# Patient Record
Sex: MALE | Race: BLACK OR AFRICAN AMERICAN | NOT HISPANIC OR LATINO | URBAN - METROPOLITAN AREA
[De-identification: names, ages, dates, MRNs, and addresses within clinical notes are randomized per-mention and may not be internally consistent; named-entity substitution may affect disease eponyms.]

---

## 2023-06-01 ENCOUNTER — OFFICE VISIT (OUTPATIENT)
Dept: PRIMARY CARE | Facility: CLINIC | Age: 43
End: 2023-06-01
Payer: COMMERCIAL

## 2023-06-01 VITALS
HEART RATE: 85 BPM | DIASTOLIC BLOOD PRESSURE: 98 MMHG | SYSTOLIC BLOOD PRESSURE: 162 MMHG | HEIGHT: 68 IN | OXYGEN SATURATION: 98 % | RESPIRATION RATE: 12 BRPM | BODY MASS INDEX: 31.52 KG/M2 | WEIGHT: 208 LBS

## 2023-06-01 DIAGNOSIS — N34.2 URETHRITIS: Primary | ICD-10-CM

## 2023-06-01 DIAGNOSIS — R30.0 BURNING WITH URINATION: ICD-10-CM

## 2023-06-01 DIAGNOSIS — Z20.2 POSSIBLE EXPOSURE TO STD: ICD-10-CM

## 2023-06-01 PROBLEM — M54.16 LEFT LUMBAR RADICULOPATHY: Status: ACTIVE | Noted: 2023-06-01

## 2023-06-01 PROBLEM — K76.9 CHRONIC LIVER DISEASE: Status: ACTIVE | Noted: 2023-06-01

## 2023-06-01 PROBLEM — M54.30 SCIATIC PAIN: Status: ACTIVE | Noted: 2023-06-01

## 2023-06-01 PROBLEM — K21.00 REFLUX ESOPHAGITIS: Status: ACTIVE | Noted: 2023-06-01

## 2023-06-01 PROBLEM — S39.840A PENILE FRACTURE, INITIAL ENCOUNTER: Status: ACTIVE | Noted: 2023-06-01

## 2023-06-01 PROBLEM — A74.9 CHLAMYDIA INFECTION: Status: ACTIVE | Noted: 2023-06-01

## 2023-06-01 PROBLEM — J30.9 ALLERGIC RHINITIS: Status: ACTIVE | Noted: 2023-06-01

## 2023-06-01 PROBLEM — B96.81 HELICOBACTER PYLORI GASTRITIS: Status: ACTIVE | Noted: 2023-06-01

## 2023-06-01 PROBLEM — M79.641 PAIN OF RIGHT HAND: Status: ACTIVE | Noted: 2023-06-01

## 2023-06-01 PROBLEM — A04.8 H. PYLORI INFECTION: Status: ACTIVE | Noted: 2023-06-01

## 2023-06-01 PROBLEM — R53.1 WEAKNESS GENERALIZED: Status: ACTIVE | Noted: 2023-06-01

## 2023-06-01 PROBLEM — K29.70 HELICOBACTER PYLORI GASTRITIS: Status: ACTIVE | Noted: 2023-06-01

## 2023-06-01 PROBLEM — N52.9 MALE ERECTILE DISORDER: Status: ACTIVE | Noted: 2023-06-01

## 2023-06-01 PROBLEM — S05.31XA: Status: ACTIVE | Noted: 2023-06-01

## 2023-06-01 PROBLEM — N48.89 PENILE PAIN: Status: ACTIVE | Noted: 2023-06-01

## 2023-06-01 PROBLEM — R10.9 ABDOMINAL PAIN: Status: ACTIVE | Noted: 2023-06-01

## 2023-06-01 PROBLEM — K62.5 BRBPR (BRIGHT RED BLOOD PER RECTUM): Status: ACTIVE | Noted: 2023-06-01

## 2023-06-01 PROBLEM — R74.8 ELEVATED LIVER ENZYMES: Status: ACTIVE | Noted: 2023-06-01

## 2023-06-01 PROBLEM — K57.32 DIVERTICULITIS OF COLON: Status: ACTIVE | Noted: 2023-06-01

## 2023-06-01 PROBLEM — F10.10 ALCOHOL ABUSE, CONTINUOUS: Status: ACTIVE | Noted: 2023-06-01

## 2023-06-01 PROBLEM — R39.9 LOWER URINARY TRACT SYMPTOMS (LUTS): Status: ACTIVE | Noted: 2023-06-01

## 2023-06-01 PROBLEM — K59.00 CONSTIPATION: Status: ACTIVE | Noted: 2023-06-01

## 2023-06-01 PROBLEM — I10 BENIGN ESSENTIAL HYPERTENSION: Status: ACTIVE | Noted: 2023-06-01

## 2023-06-01 PROBLEM — K76.0 FATTY LIVER: Status: ACTIVE | Noted: 2023-06-01

## 2023-06-01 PROBLEM — I10 HYPERTENSION: Status: ACTIVE | Noted: 2023-06-01

## 2023-06-01 LAB
POC APPEARANCE, URINE: CLEAR
POC BILIRUBIN, URINE: NEGATIVE
POC BLOOD, URINE: NEGATIVE
POC COLOR, URINE: YELLOW
POC GLUCOSE, URINE: NEGATIVE MG/DL
POC KETONES, URINE: NEGATIVE MG/DL
POC LEUKOCYTES, URINE: NEGATIVE
POC NITRITE,URINE: NEGATIVE
POC PH, URINE: 6.5 PH
POC PROTEIN, URINE: NEGATIVE MG/DL
POC SPECIFIC GRAVITY, URINE: 1.02
POC UROBILINOGEN, URINE: 0.2 EU/DL

## 2023-06-01 PROCEDURE — 87591 N.GONORRHOEAE DNA AMP PROB: CPT

## 2023-06-01 PROCEDURE — 1036F TOBACCO NON-USER: CPT | Performed by: INTERNAL MEDICINE

## 2023-06-01 PROCEDURE — 81002 URINALYSIS NONAUTO W/O SCOPE: CPT | Performed by: INTERNAL MEDICINE

## 2023-06-01 PROCEDURE — 3080F DIAST BP >= 90 MM HG: CPT | Performed by: INTERNAL MEDICINE

## 2023-06-01 PROCEDURE — 3077F SYST BP >= 140 MM HG: CPT | Performed by: INTERNAL MEDICINE

## 2023-06-01 PROCEDURE — 87491 CHLMYD TRACH DNA AMP PROBE: CPT

## 2023-06-01 PROCEDURE — 99213 OFFICE O/P EST LOW 20 MIN: CPT | Performed by: INTERNAL MEDICINE

## 2023-06-01 RX ORDER — DOXYCYCLINE 100 MG/1
100 CAPSULE ORAL 2 TIMES DAILY
Qty: 14 CAPSULE | Refills: 0 | Status: SHIPPED | OUTPATIENT
Start: 2023-06-01 | End: 2023-06-08

## 2023-06-01 NOTE — PROGRESS NOTES
"Subjective   Chief complaint: Lane Ireland is a 42 y.o. male who presents for burning with urinaton  (Pt c/o burning with urination for 2 weeks, pt denies any discharge. He has had unprotected intercourse lately ).    HPI:  Burning on urination burning while having ejaculation suspect sexually transmitted disease        Objective   BP (!) 162/98   Pulse 85   Resp 12   Ht 1.727 m (5' 8\")   Wt 94.3 kg (208 lb)   SpO2 98%   BMI 31.63 kg/m²   Physical Exam  Vitals reviewed.   Constitutional:       Appearance: Normal appearance.   HENT:      Head: Normocephalic and atraumatic.   Cardiovascular:      Rate and Rhythm: Normal rate and regular rhythm.   Pulmonary:      Effort: Pulmonary effort is normal.      Breath sounds: Normal breath sounds.   Abdominal:      General: Bowel sounds are normal.      Palpations: Abdomen is soft.   Musculoskeletal:      Cervical back: Neck supple.   Skin:     General: Skin is warm and dry.   Neurological:      General: No focal deficit present.      Mental Status: He is alert.   Psychiatric:         Mood and Affect: Mood normal.         Behavior: Behavior is cooperative.         I have reviewed and reconciled the medication list with the patient today. No current outpatient medications on file.     Imaging:  No results found.     Labs reviewed:    Lab Results   Component Value Date    WBC 6.4 04/11/2022    HGB 13.8 04/11/2022    HCT 42.5 04/11/2022     04/11/2022    CHOL 214 (H) 04/11/2022    TRIG 119 04/11/2022    HDL 72.1 04/11/2022    ALT 42 04/11/2022    AST 41 (H) 04/11/2022     (L) 04/11/2022    K 4.8 04/11/2022    CL 96 (L) 04/11/2022    CREATININE 0.72 04/11/2022    BUN 11 04/11/2022    CO2 26 04/11/2022    TSH 2.04 05/11/2021    INR 0.9 05/11/2021    HGBA1C 5.1 04/11/2022       Assessment/Plan   Problem List Items Addressed This Visit          Nervous    Burning with urination    Relevant Orders    POCT UA (nonautomated) manually resulted (Completed)    C. " Trachomatis / N. Gonorrhoeae, Amplified Detection     Other Visit Diagnoses       Possible exposure to STD        Relevant Orders    C. Trachomatis / N. Gonorrhoeae, Amplified Detection            Continue current medications as listed  Follow up inIn 1 week  Doxycycline for 1

## 2023-06-02 LAB
CHLAMYDIA TRACH., AMPLIFIED: NEGATIVE
N. GONORRHEA, AMPLIFIED: NEGATIVE

## 2023-08-04 ENCOUNTER — OFFICE VISIT (OUTPATIENT)
Dept: PRIMARY CARE | Facility: CLINIC | Age: 43
End: 2023-08-04
Payer: COMMERCIAL

## 2023-08-04 VITALS
WEIGHT: 209 LBS | RESPIRATION RATE: 12 BRPM | HEART RATE: 85 BPM | SYSTOLIC BLOOD PRESSURE: 138 MMHG | OXYGEN SATURATION: 98 % | HEIGHT: 69 IN | BODY MASS INDEX: 30.96 KG/M2 | DIASTOLIC BLOOD PRESSURE: 82 MMHG

## 2023-08-04 DIAGNOSIS — M77.9 TENDINITIS: Primary | ICD-10-CM

## 2023-08-04 DIAGNOSIS — M79.601 PAIN OF RIGHT UPPER EXTREMITY: ICD-10-CM

## 2023-08-04 PROBLEM — I10 PRIMARY HYPERTENSION: Status: ACTIVE | Noted: 2021-12-21

## 2023-08-04 PROCEDURE — 3079F DIAST BP 80-89 MM HG: CPT | Performed by: INTERNAL MEDICINE

## 2023-08-04 PROCEDURE — 1036F TOBACCO NON-USER: CPT | Performed by: INTERNAL MEDICINE

## 2023-08-04 PROCEDURE — 99214 OFFICE O/P EST MOD 30 MIN: CPT | Performed by: INTERNAL MEDICINE

## 2023-08-04 PROCEDURE — 3075F SYST BP GE 130 - 139MM HG: CPT | Performed by: INTERNAL MEDICINE

## 2023-08-04 RX ORDER — METHYLPREDNISOLONE 4 MG/1
TABLET ORAL
Qty: 21 TABLET | Refills: 0 | Status: SHIPPED | OUTPATIENT
Start: 2023-08-04 | End: 2023-08-11

## 2023-08-04 RX ORDER — MELOXICAM 7.5 MG/1
7.5 TABLET ORAL 2 TIMES DAILY
Qty: 60 TABLET | Refills: 11 | Status: SHIPPED | OUTPATIENT
Start: 2023-08-04

## 2023-08-04 NOTE — PROGRESS NOTES
"Subjective   Chief complaint: Lane Ireland is a 42 y.o. male who presents for Arm Pain (Pt c/o that for 2 weeks he been having right elbow and arm pain that shoots up to his shoulder ,he is taking motrin 600 not helping. ).    HPI:  Follow-up right elbow pain radiate all the way to the wrist area and when he tried to make a  or try to hold of something he feels the pain going out all the way in the forearm to the elbow patient work with his hand and turn his hand all the time at work.  Trying to avoid that by working on the other arm which is making him in pain    Arm Pain         Objective   /82   Pulse 85   Resp 12   Ht 1.753 m (5' 9\")   Wt 94.8 kg (209 lb)   SpO2 98%   BMI 30.86 kg/m²   Physical Exam  Vitals reviewed.   Constitutional:       Appearance: Normal appearance.   HENT:      Head: Normocephalic and atraumatic.   Cardiovascular:      Rate and Rhythm: Normal rate and regular rhythm.   Pulmonary:      Effort: Pulmonary effort is normal.      Breath sounds: Normal breath sounds.   Abdominal:      General: Bowel sounds are normal.      Palpations: Abdomen is soft.   Musculoskeletal:      Cervical back: Neck supple.      Comments: Reproduced when the patient grab an object or flex his hand   Skin:     General: Skin is warm and dry.   Neurological:      General: No focal deficit present.      Mental Status: He is alert.   Psychiatric:         Mood and Affect: Mood normal.         Behavior: Behavior is cooperative.         I have reviewed and reconciled the medication list with the patient today. No current outpatient medications on file.     Imaging:  No results found.     Labs reviewed:    Lab Results   Component Value Date    WBC 6.4 04/11/2022    HGB 13.8 04/11/2022    HCT 42.5 04/11/2022     04/11/2022    CHOL 214 (H) 04/11/2022    TRIG 119 04/11/2022    HDL 72.1 04/11/2022    ALT 42 04/11/2022    AST 41 (H) 04/11/2022     (L) 04/11/2022    K 4.8 04/11/2022    CL 96 (L) " 04/11/2022    CREATININE 0.72 04/11/2022    BUN 11 04/11/2022    CO2 26 04/11/2022    TSH 2.04 05/11/2021    INR 0.9 05/11/2021    HGBA1C 5.1 04/11/2022       Assessment/Plan   Problem List Items Addressed This Visit       Pain of right upper extremity    Tendinitis - Primary   For the acute tendinitis we are going to start steroid Dosepak.  Mobic 7-1/2 twice a day    Continue current medications as listed  Follow up in 2 months for complete physical

## 2023-08-25 ENCOUNTER — OFFICE VISIT (OUTPATIENT)
Dept: PRIMARY CARE | Facility: CLINIC | Age: 43
End: 2023-08-25
Payer: COMMERCIAL

## 2023-08-25 VITALS
HEART RATE: 79 BPM | RESPIRATION RATE: 12 BRPM | SYSTOLIC BLOOD PRESSURE: 112 MMHG | BODY MASS INDEX: 31.01 KG/M2 | OXYGEN SATURATION: 96 % | DIASTOLIC BLOOD PRESSURE: 80 MMHG | WEIGHT: 210 LBS

## 2023-08-25 DIAGNOSIS — R10.9 ABDOMINAL PAIN, UNSPECIFIED ABDOMINAL LOCATION: ICD-10-CM

## 2023-08-25 DIAGNOSIS — R31.9 HEMATURIA, UNSPECIFIED TYPE: ICD-10-CM

## 2023-08-25 DIAGNOSIS — N30.01 ACUTE CYSTITIS WITH HEMATURIA: Primary | ICD-10-CM

## 2023-08-25 PROCEDURE — 3074F SYST BP LT 130 MM HG: CPT | Performed by: INTERNAL MEDICINE

## 2023-08-25 PROCEDURE — 1036F TOBACCO NON-USER: CPT | Performed by: INTERNAL MEDICINE

## 2023-08-25 PROCEDURE — 99214 OFFICE O/P EST MOD 30 MIN: CPT | Performed by: INTERNAL MEDICINE

## 2023-08-25 PROCEDURE — 3079F DIAST BP 80-89 MM HG: CPT | Performed by: INTERNAL MEDICINE

## 2023-08-25 PROCEDURE — 81003 URINALYSIS AUTO W/O SCOPE: CPT | Performed by: INTERNAL MEDICINE

## 2023-08-25 RX ORDER — SULFAMETHOXAZOLE AND TRIMETHOPRIM 800; 160 MG/1; MG/1
1 TABLET ORAL 2 TIMES DAILY
Qty: 14 TABLET | Refills: 0 | Status: SHIPPED | OUTPATIENT
Start: 2023-08-25 | End: 2023-09-01

## 2023-08-25 RX ORDER — PHENAZOPYRIDINE HYDROCHLORIDE 100 MG/1
100 TABLET, FILM COATED ORAL 3 TIMES DAILY PRN
Qty: 90 TABLET | Refills: 0 | Status: SHIPPED | OUTPATIENT
Start: 2023-08-25

## 2023-08-25 ASSESSMENT — ENCOUNTER SYMPTOMS: ABDOMINAL PAIN: 1

## 2023-08-25 NOTE — PROGRESS NOTES
Subjective   Chief complaint: Milton Limon is a 43 y.o. male who presents for Abdominal Pain (Pt c/o lower abd pain, painful when he urinated with burning and frequency for 3 days. ).    HPI:  Patient presented to me was pain in urination was discomfort there is no emiliana hematuria but the pain is very severe in the pubic area and the pelvic area and associated with urgency and frequency.    Abdominal Pain        Objective   /80   Pulse 79   Resp 12   Wt 95.3 kg (210 lb)   SpO2 96%   BMI 31.01 kg/m²   Physical Exam  Vitals reviewed.   Constitutional:       Appearance: Normal appearance.   HENT:      Head: Normocephalic and atraumatic.   Cardiovascular:      Rate and Rhythm: Normal rate and regular rhythm.   Pulmonary:      Effort: Pulmonary effort is normal.      Breath sounds: Normal breath sounds.   Abdominal:      General: Bowel sounds are normal.      Palpations: Abdomen is soft.   Genitourinary:     Comments: Tender suprapubic area  Musculoskeletal:      Cervical back: Neck supple.   Skin:     General: Skin is warm and dry.   Neurological:      General: No focal deficit present.      Mental Status: He is alert.   Psychiatric:         Mood and Affect: Mood normal.         Behavior: Behavior is cooperative.         I have reviewed and reconciled the medication list with the patient today.   Current Outpatient Medications:     meloxicam (Mobic) 7.5 mg tablet, Take 1 tablet (7.5 mg) by mouth 2 times a day. (Patient not taking: Reported on 8/25/2023), Disp: 60 tablet, Rfl: 11     Imaging:  CT abdomen pelvis wo IV contrast    Result Date: 8/25/2023  Interpreted By:  FARHAD MATHIS MD MRN: [id number] Patient Name: MILTON LIMON  STUDY: CT ABDOMEN AND PELVIS WO CONTRAST;  8/25/2023 1:29 pm  INDICATION: KIDNEY STONE PROTOCOL , BLOOD IN URINE, ABDOMINAL PAIN.  COMPARISON: None  ACCESSION NUMBER(S): [id number]  ORDERING CLINICIAN: CORINA MANZANO  TECHNIQUE: CT of the abdomen and pelvis was performed. Contiguous  axial images were obtained at 3 mm slice thickness through the abdomen and pelvis. Coronal and sagittal reconstructions at 3 mm slice thickness were performed.  No intravenous contrast was administered; positive oral contrast was given.  FINDINGS: Please note that the evaluation of vessels, lymph nodes and organs is limited without intravenous contrast.  LOWER CHEST: The visualized lung base is unremarkable. The heart is normal in size without evidence of pericardial effusion. No pleural effusion is present. Visualized distal esophagus appears normal.  ABDOMEN:  LIVER: The liver is normal in size without evidence of focal liver lesions.  BILE DUCTS: The intrahepatic and extrahepatic ducts are not dilated.  GALLBLADDER: Unremarkable  PANCREAS: No duct dilatation.  SPLEEN: No splenomegaly.  ADRENAL GLANDS: No adrenal nodules.  KIDNEYS AND URETERS: The kidneys are normal in size and unremarkable in appearance.  No hydroureteronephrosis or nephroureterolithiasis is identified.  PELVIS:  BLADDER: Within normal limits  REPRODUCTIVE ORGANS: Unremarkable prostate gland  BOWEL/peritoneal space: No bowel dilatation. Unremarkable appendix. Uncomplicated colonic diverticulosis. No ascites. No pneumoperitoneum.  VESSELS: There is no aneurysmal dilatation of the abdominal aorta. The IVC appears normal.  LYMPH NODES: No enlarged lymphadenopathy.  ABDOMINAL WALL: Within normal limits.  BONES: Mild degenerative spine changes.      1.  No acute abnormality in the abdomen or pelvis within the limitation of this unenhanced study. No nephrolithiasis or hydronephrosis as clinically queried. 2. Uncomplicated colonic diverticulosis.   MACRO: None       Labs reviewed:    Lab Results   Component Value Date    WBC 6.4 04/11/2022    HGB 13.8 04/11/2022    HCT 42.5 04/11/2022     04/11/2022    CHOL 214 (H) 04/11/2022    TRIG 119 04/11/2022    HDL 72.1 04/11/2022    ALT 42 04/11/2022    AST 41 (H) 04/11/2022     (L) 04/11/2022    K  4.8 04/11/2022    CL 96 (L) 04/11/2022    CREATININE 0.72 04/11/2022    BUN 11 04/11/2022    CO2 26 04/11/2022    TSH 2.04 05/11/2021    INR 0.9 05/11/2021    HGBA1C 5.1 04/11/2022       Assessment/Plan   Problem List Items Addressed This Visit       Abdominal pain    Relevant Orders    CT abdomen pelvis wo IV contrast (Completed)     Other Visit Diagnoses       Hematuria, unspecified type        Relevant Orders    CT abdomen pelvis wo IV contrast (Completed)            Continue current medications as listed  Follow up in 1 week

## 2023-08-25 NOTE — ASSESSMENT & PLAN NOTE
Patient has blood in the urine with the pain I suspect is cystitis  Bactrim and Pyridium for 7 days.  Because of the severe pain possibly a bladder stones patient will be sent for CT scan with kidney stone protocol.

## 2023-08-28 ENCOUNTER — CLINICAL SUPPORT (OUTPATIENT)
Dept: PRIMARY CARE | Facility: CLINIC | Age: 43
End: 2023-08-28
Payer: COMMERCIAL

## 2023-08-28 DIAGNOSIS — R53.1 WEAKNESS GENERALIZED: ICD-10-CM

## 2023-08-28 DIAGNOSIS — I10 BENIGN ESSENTIAL HYPERTENSION: ICD-10-CM

## 2023-08-28 DIAGNOSIS — E78.00 ELEVATED LDL CHOLESTEROL LEVEL: ICD-10-CM

## 2023-08-28 DIAGNOSIS — E03.9 HYPOTHYROIDISM, UNSPECIFIED TYPE: ICD-10-CM

## 2023-08-28 DIAGNOSIS — Z11.3 SCREEN FOR STD (SEXUALLY TRANSMITTED DISEASE): ICD-10-CM

## 2023-08-28 DIAGNOSIS — R10.30 LOWER ABDOMINAL PAIN: ICD-10-CM

## 2023-08-28 DIAGNOSIS — R31.9 HEMATURIA, UNSPECIFIED TYPE: ICD-10-CM

## 2023-08-28 DIAGNOSIS — D50.9 IRON DEFICIENCY ANEMIA, UNSPECIFIED IRON DEFICIENCY ANEMIA TYPE: ICD-10-CM

## 2023-08-28 DIAGNOSIS — Z00.00 ENCOUNTER FOR ANNUAL PHYSICAL EXAM: ICD-10-CM

## 2023-08-28 DIAGNOSIS — I10 PRIMARY HYPERTENSION: ICD-10-CM

## 2023-08-28 LAB
ALANINE AMINOTRANSFERASE (SGPT) (U/L) IN SER/PLAS: 18 U/L (ref 10–52)
ALBUMIN (G/DL) IN SER/PLAS: 4.7 G/DL (ref 3.4–5)
ALKALINE PHOSPHATASE (U/L) IN SER/PLAS: 36 U/L (ref 33–120)
ANION GAP IN SER/PLAS: 11 MMOL/L (ref 10–20)
ASPARTATE AMINOTRANSFERASE (SGOT) (U/L) IN SER/PLAS: 16 U/L (ref 9–39)
BILIRUBIN TOTAL (MG/DL) IN SER/PLAS: 0.6 MG/DL (ref 0–1.2)
CALCIUM (MG/DL) IN SER/PLAS: 9.6 MG/DL (ref 8.6–10.6)
CARBON DIOXIDE, TOTAL (MMOL/L) IN SER/PLAS: 24 MMOL/L (ref 21–32)
CHLORIDE (MMOL/L) IN SER/PLAS: 105 MMOL/L (ref 98–107)
CHOLESTEROL (MG/DL) IN SER/PLAS: 204 MG/DL (ref 0–199)
CHOLESTEROL IN HDL (MG/DL) IN SER/PLAS: 36.8 MG/DL
CHOLESTEROL/HDL RATIO: 5.5
CREATININE (MG/DL) IN SER/PLAS: 0.95 MG/DL (ref 0.5–1.3)
ERYTHROCYTE DISTRIBUTION WIDTH (RATIO) BY AUTOMATED COUNT: 12.7 % (ref 11.5–14.5)
ERYTHROCYTE MEAN CORPUSCULAR HEMOGLOBIN CONCENTRATION (G/DL) BY AUTOMATED: 32.7 G/DL (ref 32–36)
ERYTHROCYTE MEAN CORPUSCULAR VOLUME (FL) BY AUTOMATED COUNT: 94 FL (ref 80–100)
ERYTHROCYTES (10*6/UL) IN BLOOD BY AUTOMATED COUNT: 4.99 X10E12/L (ref 4.5–5.9)
GFR MALE: >90 ML/MIN/1.73M2
GLUCOSE (MG/DL) IN SER/PLAS: 100 MG/DL (ref 74–99)
HEMATOCRIT (%) IN BLOOD BY AUTOMATED COUNT: 47.1 % (ref 41–52)
HEMOGLOBIN (G/DL) IN BLOOD: 15.4 G/DL (ref 13.5–17.5)
HEPATITIS A VIRUS IGM AB PRESENCE IN SER/PLAS BY IMMUNOASSAY: NONREACTIVE
HEPATITIS B VIRUS CORE IGM AB PRESENCE IN SER/PLAS BY IMMUNOASSY: NONREACTIVE
HEPATITIS B VIRUS SURFACE AG PRESENCE IN SERUM: NONREACTIVE
HEPATITIS C VIRUS AB PRESENCE IN SERUM: NONREACTIVE
HIV 1/ 2 AG/AB SCREEN: NONREACTIVE
LDL: 153 MG/DL (ref 0–99)
LEUKOCYTES (10*3/UL) IN BLOOD BY AUTOMATED COUNT: 4.1 X10E9/L (ref 4.4–11.3)
NRBC (PER 100 WBCS) BY AUTOMATED COUNT: 0 /100 WBC (ref 0–0)
PLATELETS (10*3/UL) IN BLOOD AUTOMATED COUNT: 274 X10E9/L (ref 150–450)
POTASSIUM (MMOL/L) IN SER/PLAS: 4.3 MMOL/L (ref 3.5–5.3)
PROTEIN TOTAL: 7.1 G/DL (ref 6.4–8.2)
SODIUM (MMOL/L) IN SER/PLAS: 136 MMOL/L (ref 136–145)
TRIGLYCERIDE (MG/DL) IN SER/PLAS: 73 MG/DL (ref 0–149)
UREA NITROGEN (MG/DL) IN SER/PLAS: 12 MG/DL (ref 6–23)
VLDL: 15 MG/DL (ref 0–40)

## 2023-08-28 PROCEDURE — 84443 ASSAY THYROID STIM HORMONE: CPT

## 2023-08-28 PROCEDURE — 80074 ACUTE HEPATITIS PANEL: CPT

## 2023-08-28 PROCEDURE — 85027 COMPLETE CBC AUTOMATED: CPT

## 2023-08-28 PROCEDURE — 80053 COMPREHEN METABOLIC PANEL: CPT

## 2023-08-28 PROCEDURE — 87491 CHLMYD TRACH DNA AMP PROBE: CPT

## 2023-08-28 PROCEDURE — 86694 HERPES SIMPLEX NES ANTBDY: CPT

## 2023-08-28 PROCEDURE — 80061 LIPID PANEL: CPT

## 2023-08-28 PROCEDURE — 87591 N.GONORRHOEAE DNA AMP PROB: CPT

## 2023-08-28 PROCEDURE — 87389 HIV-1 AG W/HIV-1&-2 AB AG IA: CPT

## 2023-08-28 PROCEDURE — 82306 VITAMIN D 25 HYDROXY: CPT

## 2023-08-29 LAB
CALCIDIOL (25 OH VITAMIN D3) (NG/ML) IN SER/PLAS: 13 NG/ML
CHLAMYDIA TRACH., AMPLIFIED: POSITIVE
N. GONORRHEA, AMPLIFIED: NEGATIVE
THYROTROPIN (MIU/L) IN SER/PLAS BY DETECTION LIMIT <= 0.05 MIU/L: 2.25 MIU/L (ref 0.44–3.98)

## 2023-08-30 ENCOUNTER — OFFICE VISIT (OUTPATIENT)
Dept: PRIMARY CARE | Facility: CLINIC | Age: 43
End: 2023-08-30
Payer: COMMERCIAL

## 2023-08-30 VITALS
BODY MASS INDEX: 31.01 KG/M2 | HEART RATE: 77 BPM | SYSTOLIC BLOOD PRESSURE: 122 MMHG | DIASTOLIC BLOOD PRESSURE: 86 MMHG | OXYGEN SATURATION: 96 % | WEIGHT: 210 LBS | RESPIRATION RATE: 12 BRPM

## 2023-08-30 DIAGNOSIS — I10 BENIGN ESSENTIAL HYPERTENSION: ICD-10-CM

## 2023-08-30 DIAGNOSIS — Z00.00 ENCOUNTER FOR ANNUAL PHYSICAL EXAM: ICD-10-CM

## 2023-08-30 DIAGNOSIS — A74.9 CHLAMYDIA INFECTION: ICD-10-CM

## 2023-08-30 DIAGNOSIS — E55.9 VITAMIN D DEFICIENCY: Primary | ICD-10-CM

## 2023-08-30 DIAGNOSIS — E78.5 HYPERLIPIDEMIA, UNSPECIFIED HYPERLIPIDEMIA TYPE: ICD-10-CM

## 2023-08-30 DIAGNOSIS — N30.01 ACUTE CYSTITIS WITH HEMATURIA: ICD-10-CM

## 2023-08-30 LAB
POC APPEARANCE, URINE: ABNORMAL
POC BILIRUBIN, URINE: NEGATIVE
POC BLOOD, URINE: ABNORMAL
POC COLOR, URINE: ABNORMAL
POC GLUCOSE, URINE: NEGATIVE MG/DL
POC KETONES, URINE: NEGATIVE MG/DL
POC LEUKOCYTES, URINE: NEGATIVE
POC NITRITE,URINE: NEGATIVE
POC OCCULT BLOOD STOOL #1: NEGATIVE
POC PH, URINE: 6 PH
POC PROTEIN, URINE: ABNORMAL MG/DL
POC SPECIFIC GRAVITY, URINE: 1.01
POC UROBILINOGEN, URINE: 0.2 EU/DL

## 2023-08-30 PROCEDURE — 82270 OCCULT BLOOD FECES: CPT | Performed by: INTERNAL MEDICINE

## 2023-08-30 PROCEDURE — 3079F DIAST BP 80-89 MM HG: CPT | Performed by: INTERNAL MEDICINE

## 2023-08-30 PROCEDURE — 81002 URINALYSIS NONAUTO W/O SCOPE: CPT | Performed by: INTERNAL MEDICINE

## 2023-08-30 PROCEDURE — 3074F SYST BP LT 130 MM HG: CPT | Performed by: INTERNAL MEDICINE

## 2023-08-30 PROCEDURE — 1036F TOBACCO NON-USER: CPT | Performed by: INTERNAL MEDICINE

## 2023-08-30 PROCEDURE — 99214 OFFICE O/P EST MOD 30 MIN: CPT | Performed by: INTERNAL MEDICINE

## 2023-08-30 PROCEDURE — 99396 PREV VISIT EST AGE 40-64: CPT | Performed by: INTERNAL MEDICINE

## 2023-08-30 PROCEDURE — 93000 ELECTROCARDIOGRAM COMPLETE: CPT | Performed by: INTERNAL MEDICINE

## 2023-08-30 RX ORDER — ERGOCALCIFEROL 1.25 MG/1
50000 CAPSULE ORAL
Qty: 12 CAPSULE | Refills: 0 | Status: SHIPPED | OUTPATIENT
Start: 2023-08-30 | End: 2023-11-22

## 2023-08-30 RX ORDER — FLAXSEED OIL 1000 MG
1 CAPSULE ORAL 3 TIMES DAILY
Qty: 30 CAPSULE | Refills: 3 | Status: SHIPPED | OUTPATIENT
Start: 2023-08-30

## 2023-08-30 ASSESSMENT — PATIENT HEALTH QUESTIONNAIRE - PHQ9
1. LITTLE INTEREST OR PLEASURE IN DOING THINGS: NOT AT ALL
SUM OF ALL RESPONSES TO PHQ9 QUESTIONS 1 AND 2: 0
2. FEELING DOWN, DEPRESSED OR HOPELESS: NOT AT ALL

## 2023-08-30 ASSESSMENT — ENCOUNTER SYMPTOMS
GASTROINTESTINAL NEGATIVE: 1
CARDIOVASCULAR NEGATIVE: 1
CONSTITUTIONAL NEGATIVE: 1
OCCASIONAL FEELINGS OF UNSTEADINESS: 0
ALLERGIC/IMMUNOLOGIC NEGATIVE: 1
HEMATURIA: 1
ENDOCRINE NEGATIVE: 1
LOSS OF SENSATION IN FEET: 0
EYES NEGATIVE: 1
MUSCULOSKELETAL NEGATIVE: 1
HEMATOLOGIC/LYMPHATIC NEGATIVE: 1
DEPRESSION: 0
RESPIRATORY NEGATIVE: 1
PSYCHIATRIC NEGATIVE: 1
NEUROLOGICAL NEGATIVE: 1

## 2023-08-30 NOTE — ASSESSMENT & PLAN NOTE
Pt is still having blood in urine with lower abd pain. CT shows no abnormalities. Pt must see urology for cystoscopy.

## 2023-08-30 NOTE — PROGRESS NOTES
Subjective   Reason for Visit: Lane Ireland is an 43 y.o. male here for a Medicare Wellness visit.     Past Medical, Surgical, and Family History reviewed and updated in chart.    Reviewed all medications by prescribing practitioner or clinical pharmacist (such as prescriptions, OTCs, herbal therapies and supplements) and documented in the medical record.    HPI  Pt is 42 yo male here for annual physical. Pt finished Bactrim and pyridium for chlamydia infection and lab work is neg today. Pt still seeing blood in urine with occasional dark blood clots. Pain in lower abd after voiding and urinary frequency. Pt has urology appt for cystoscopy.   Patient Care Team:  Michaela Doyle MD as PCP - General (Internal Medicine)  Becca Pratt DO as PCP - [facility] PCP  Becca Pratt,  as PCP - [facility] PCP     Review of Systems   Constitutional: Negative.    HENT: Negative.     Eyes: Negative.    Respiratory: Negative.     Cardiovascular: Negative.    Gastrointestinal: Negative.    Endocrine: Negative.    Genitourinary:  Positive for hematuria.   Musculoskeletal: Negative.    Skin: Negative.    Allergic/Immunologic: Negative.    Neurological: Negative.    Hematological: Negative.    Psychiatric/Behavioral: Negative.     All other systems reviewed and are negative.      Objective   Vitals:  There were no vitals taken for this visit.      Physical Exam  Vitals reviewed.   Constitutional:       Appearance: Normal appearance.   HENT:      Head: Normocephalic and atraumatic.   Cardiovascular:      Rate and Rhythm: Normal rate and regular rhythm.      Pulses: Normal pulses.      Heart sounds: Normal heart sounds.   Pulmonary:      Effort: Pulmonary effort is normal.      Breath sounds: Normal breath sounds.   Musculoskeletal:         General: Normal range of motion.      Cervical back: Normal range of motion and neck supple.   Skin:     General: Skin is warm and dry.   Neurological:      General: No focal deficit  present.      Mental Status: He is alert.   Psychiatric:         Mood and Affect: Mood normal.         Behavior: Behavior normal.       Assessment/Plan   Problem List Items Addressed This Visit          Cardiac and Vasculature    Benign essential hypertension    Current Assessment & Plan     Controlled          Relevant Orders    ECG 12 lead (Clinic Performed)    Hyperlipidemia    Current Assessment & Plan     Low cholesterol diet with flaxseed oil every meal.             Endocrine/Metabolic    Vitamin D deficiency - Primary    Current Assessment & Plan     Start vit D once a week            Genitourinary and Reproductive    Acute cystitis with hematuria    Current Assessment & Plan     Pt is still having blood in urine with lower abd pain. CT shows no abnormalities. Pt must see urology for cystoscopy.             Health Encounters    Encounter for annual physical exam    Relevant Orders    POCT UA (nonautomated) manually resulted (Completed)    POCT occult blood stool manually resulted (Completed)    ECG 12 lead (Clinic Performed)       Infectious Diseases    Chlamydia infection    Current Assessment & Plan     Finished course of bactrim and pyridum        See urology for cystoscopy and follow up with results. Pt may need another course of antibiotics to treat cystitis. Continue vit D and flaxseed oil with dietary changes.

## 2023-08-31 LAB — URINE CULTURE: NO GROWTH

## 2023-09-01 LAB — HERPES SIMPLEX VIRUS I/II ANTIBODY, IGM: 3.11 IV

## 2023-09-29 DIAGNOSIS — M79.601 PAIN OF RIGHT UPPER EXTREMITY: ICD-10-CM

## 2023-09-30 RX ORDER — METHYLPREDNISOLONE 4 MG/1
TABLET ORAL
Qty: 21 TABLET | Refills: 0 | Status: SHIPPED | OUTPATIENT
Start: 2023-09-30 | End: 2023-10-06

## 2023-10-13 ENCOUNTER — PROCEDURE VISIT (OUTPATIENT)
Dept: UROLOGY | Facility: HOSPITAL | Age: 43
End: 2023-10-13
Payer: COMMERCIAL

## 2023-10-13 DIAGNOSIS — Z30.2 ENCOUNTER FOR VASECTOMY: ICD-10-CM

## 2023-10-13 PROCEDURE — 55250 REMOVAL OF SPERM DUCT(S): CPT | Performed by: UROLOGY

## 2023-10-13 NOTE — PROGRESS NOTES
Vasectomy Procedure Note    Indications: 43 y.o. male desiring permanent sterilization    Pre-operative Diagnosis: Undesired fertility    Post-operative Diagnosis: Undesired fertility    Anesthesia: 1% Lidocaine without epinephrine    Procedure Details     The risks and benefits of the procedure were discussed at the preprocedure consultation, and written, informed consent obtained.    Penis taped to abdomen.  The scrotum was cleansed with warm Betadine and draped in the usual sterile manner. Local anesthesia was infiltrated in anterior raphe of the scrotum.  After adequate anesthesia was established, scalpel used to make a midline incision approximately 1cm in length.      Left vas isolated and brought to incision with vas clamp. Vas freed from surrounding tissue using dissecting scissors.  Midportion removed.  Cautery applied to both ends of vas. 3 - 0 chromic used to create an interposition. No bleeding noted and vas ends released to scrotal sac.    Right vas isolated and brought to incision with vas clamp. Vas freed from surrounding tissue using dissecting scissors.  Midportion removed and were not sent to pathology to confirm.  Cautery applied to both ends of vas. 3 - 0 chromic used to create an interposition. No bleeding noted and vas ends released to scrotal sac.    Wound visualized and hemostasis noted. 3-0 chromic horizontal mattress stitch was placed, skin glue was applied.  Bulky dressing / Kerlix applied to scrotal area and jock strap applied.    Specimen: None    Condition: Stable    Complications: None apparent    Plan:  1. Continue contraception until negative sperm analysis. Post vasectomy Semen Analysis (PVSA) after 15-20 ejaculations and 2-3m.  2. Warning signs of infection were reviewed.   3. Post procedure instructions given  Ice pack every 3 hours for 24 hrs.    Call the clinic if excessive pain, bleeding or swelling.  No strenous exercise or sexual activity for 1wk  4. Recommended that the  patient use OTC pain medications as needed for pain.

## 2024-02-26 ENCOUNTER — HOSPITAL ENCOUNTER (EMERGENCY)
Facility: HOSPITAL | Age: 44
Discharge: HOME | End: 2024-02-26
Payer: COMMERCIAL

## 2024-02-26 VITALS
DIASTOLIC BLOOD PRESSURE: 96 MMHG | OXYGEN SATURATION: 98 % | TEMPERATURE: 97.8 F | WEIGHT: 210 LBS | SYSTOLIC BLOOD PRESSURE: 157 MMHG | BODY MASS INDEX: 31.1 KG/M2 | HEIGHT: 69 IN | RESPIRATION RATE: 16 BRPM | HEART RATE: 79 BPM

## 2024-02-26 DIAGNOSIS — S61.001A: ICD-10-CM

## 2024-02-26 DIAGNOSIS — S61.213A LACERATION OF LEFT MIDDLE FINGER WITHOUT FOREIGN BODY WITHOUT DAMAGE TO NAIL, INITIAL ENCOUNTER: Primary | ICD-10-CM

## 2024-02-26 PROBLEM — R31.0 GROSS HEMATURIA: Status: ACTIVE | Noted: 2024-02-26

## 2024-02-26 PROCEDURE — 99283 EMERGENCY DEPT VISIT LOW MDM: CPT | Mod: 25

## 2024-02-26 PROCEDURE — 12001 RPR S/N/AX/GEN/TRNK 2.5CM/<: CPT

## 2024-02-26 PROCEDURE — 2500000005 HC RX 250 GENERAL PHARMACY W/O HCPCS

## 2024-02-26 RX ORDER — LIDOCAINE HYDROCHLORIDE 10 MG/ML
INJECTION INFILTRATION; PERINEURAL
Status: COMPLETED
Start: 2024-02-26 | End: 2024-02-26

## 2024-02-26 RX ORDER — LIDOCAINE HYDROCHLORIDE 10 MG/ML
5 INJECTION INFILTRATION; PERINEURAL ONCE
Status: COMPLETED | OUTPATIENT
Start: 2024-02-26 | End: 2024-02-26

## 2024-02-26 RX ADMIN — LIDOCAINE HYDROCHLORIDE 50 MG: 10 INJECTION INFILTRATION; PERINEURAL at 13:55

## 2024-02-26 RX ADMIN — LIDOCAINE HYDROCHLORIDE 50 MG: 10 INJECTION, SOLUTION INFILTRATION; PERINEURAL at 13:55

## 2024-02-26 ASSESSMENT — PAIN DESCRIPTION - LOCATION: LOCATION: HAND

## 2024-02-26 ASSESSMENT — PAIN DESCRIPTION - ORIENTATION: ORIENTATION: LEFT;RIGHT

## 2024-02-26 ASSESSMENT — PAIN SCALES - GENERAL: PAINLEVEL_OUTOF10: 2

## 2024-02-26 ASSESSMENT — COLUMBIA-SUICIDE SEVERITY RATING SCALE - C-SSRS
2. HAVE YOU ACTUALLY HAD ANY THOUGHTS OF KILLING YOURSELF?: NO
1. IN THE PAST MONTH, HAVE YOU WISHED YOU WERE DEAD OR WISHED YOU COULD GO TO SLEEP AND NOT WAKE UP?: NO
6. HAVE YOU EVER DONE ANYTHING, STARTED TO DO ANYTHING, OR PREPARED TO DO ANYTHING TO END YOUR LIFE?: NO

## 2024-02-26 ASSESSMENT — PAIN - FUNCTIONAL ASSESSMENT: PAIN_FUNCTIONAL_ASSESSMENT: 0-10

## 2024-02-26 NOTE — DISCHARGE INSTRUCTIONS
Please keep wound clean dry and covered for the next 24 hours.  After 24 hours may wash wound gently with soap and water twice per day.  Pat dry.  May apply topical antibiotic ointment to aid with wound healing and prevent infection.  Follow-up with your primary care provider within the next 10 to 14 days for suture removal.  Return to ER for any signs of infection.

## 2024-02-26 NOTE — ED PROVIDER NOTES
Chief Complaint   Patient presents with    Hand Injury     HPI:   Lane Ireland is an 43 y.o. male with history of fatty liver, HLD, vitamin deficiency that presents to the ED with significant other for evaluation of bilateral hand wounds.  Patient says he was at home and he was trying to pull on something and cut himself with a metal pipe.  He has a cut on the palm of his right thumb and on the dorsal aspect of his left middle finger.  Last tetanus was updated in 2021.  He denies any numbness, tingling, extremity weakness.  He is right-hand dominant.  He is not on anticoagulants.  Has otherwise been healthy lately with no recent fever, chills, chest pain or shortness of breath.    Medications: None  Soc HX: Works in construction  Allergies   Allergen Reactions    Bee Pollen Unknown    House Dust Unknown    Penicillins Unknown   :  Past Medical History:   Diagnosis Date    Acute pharyngitis, unspecified 10/29/2014    Sore throat    Contact with and (suspected) exposure to infections with a predominantly sexual mode of transmission 06/01/2015    STD exposure    Other specified health status     No pertinent past medical history    Personal history of other diseases of the respiratory system 10/29/2014    History of streptococcal pharyngitis     Past Surgical History:   Procedure Laterality Date    OTHER SURGICAL HISTORY  02/22/2021    No history of surgery     No family history on file.     Physical Exam  Vitals and nursing note reviewed.   Constitutional:       General: He is not in acute distress.     Appearance: Normal appearance. He is not ill-appearing or toxic-appearing.   Eyes:      Pupils: Pupils are equal, round, and reactive to light.   Cardiovascular:      Rate and Rhythm: Normal rate and regular rhythm.      Pulses: Normal pulses.      Heart sounds: No murmur heard.  Pulmonary:      Effort: Pulmonary effort is normal. No respiratory distress.      Breath sounds: Normal breath sounds.   Musculoskeletal:          General: No deformity or signs of injury. Normal range of motion.      Comments: 5/5 strength bilateral hands.   Lymphadenopathy:      Cervical: No cervical adenopathy.   Skin:     General: Skin is warm and dry.      Capillary Refill: Capillary refill takes less than 2 seconds.      Findings: No bruising or rash.      Comments: Patient has 2 cm lesion of dermis on right thenar eminence and 1 cm curvilinear flap laceration over the dorsal aspect of left third DIP   Neurological:      Mental Status: He is alert.      Cranial Nerves: No cranial nerve deficit.      Comments: 2-point sensation intact bilaterally with normal cap refill     VS: As documented in the triage note and EMR flowsheet from this visit were reviewed.      Medical Decision Making:   ED Course as of 02/26/24 1424 Mon Feb 26, 2024   1303 Vitals Reviewed: Afebrile. Hypertensive. Not tachycardic nor tachypneic. No hypoxia.   [KA]   1410 Patient is a 43-year-old male who presents to the ED for evaluation of 2 lacerations.  He has 2 cm laceration on the right thenar eminence.  Dermis was given has been completely removed.  Nothing to suture.  Cleaned, dressed with Xeroform and Band-Aid.  Patient had 1 cm curvilinear flap laceration across the DIP of left third digit.  He tolerated wound care well.  He was able to verbalize understanding wound care instructions using teach back method.  Advised that he should follow-up for suture removal in 10 to 14 days and return to ER for any signs of infection.  He is agreeable.  On chart review was able to find that his last tetanus was in 2021.  Was not updated in the ED today. [KA]      ED Course User Index  [KA] Verito Sarah PA-C         Diagnoses as of 02/26/24 1424   Laceration of left middle finger without foreign body without damage to nail, initial encounter   Avulsion of skin of right thumb without complication, initial encounter     Laceration Repair    Performed by: Verito Sarah,  SHARMILA  Authorized by: Verito Sarah PA-C    Consent:     Consent obtained:  Verbal    Consent given by:  Patient    Risks, benefits, and alternatives were discussed: yes      Risks discussed:  Infection, nerve damage, poor cosmetic result and pain    Alternatives discussed:  No treatment  Universal protocol:     Procedure explained and questions answered to patient or proxy's satisfaction: yes      Patient identity confirmed:  Verbally with patient  Anesthesia:     Anesthesia method:  Local infiltration    Local anesthetic:  Lidocaine 1% w/o epi  Laceration details:     Location:  Finger    Finger location:  L long finger    Wound length (cm): 1 cm flap.  Treatment:     Area cleansed with:  Povidone-iodine and soap and water    Amount of cleaning:  Standard  Skin repair:     Repair method:  Sutures    Suture size:  3-0    Suture material:  Nylon    Suture technique:  Simple interrupted    Number of sutures:  1  Approximation:     Approximation:  Close  Repair type:     Repair type:  Simple  Post-procedure details:     Dressing:  Sterile dressing and adhesive bandage    Procedure completion:  Tolerated well, no immediate complications   Escalation of Care: Appropriate for outpatient management     Counseling: Spoke with the patient and discussed today´s findings, in addition to providing specific details for the plan of care and expected course.  Patient was given the opportunity to ask questions.    Discussed return precautions and importance of follow-up.  Advised to follow-up with PCP.  Advised to return to the ED for changing or worsening symptoms, new symptoms, complaint specific precautions, and precautions listed on the discharge paperwork.  Educated on the common potential side effects of medications prescribed.    I advised the patient that the emergency evaluation and treatment provided today doesn't end their need for medical care. It is very important that they follow-up with their primary care  provider or other specialist as instructed.    The plan of care was mutually agreed upon with the patient. The patient and/or family were given the opportunity to ask questions. All questions asked today in the ED were answered to the best of my ability with today's information.    I specifically advised the patient to return to the ED for changing or worsening symptoms, worrisome new symptoms, or for any complaint specific precautions listed on the discharge paperwork.    This patient was cared for in the setting of nationwide stress on resources and staffing.    This report was transcribed using voice recognition software.  Every effort was made to ensure accuracy, however, inadvertently computerized transcription errors may be present.       Verito Sarah PA-C  02/26/24 8220

## 2024-02-26 NOTE — ED TRIAGE NOTES
PT PRESENTS TO THE ED FOR HAND LACERATIONS THAT OCCURRED TODAY WHEN HE WAS TRYING TO PULL ON SOMETHING AND CUT HIMSELF WITH A PIPE. PT ENDORSE TWO CUTS ON ON RIGHT PALM AND LEFT MIDDLE FINGER. PT DENIES LOC. PT IS NOT UP TO DATE ON TDAP. PT DENIES CHEST PAIN OR SOB AT THIS TIME.

## 2024-08-16 DIAGNOSIS — N52.9 MALE ERECTILE DISORDER: ICD-10-CM

## 2024-08-16 RX ORDER — TADALAFIL 5 MG/1
5 TABLET ORAL
Qty: 30 TABLET | Refills: 11 | Status: SHIPPED | OUTPATIENT
Start: 2024-08-16

## 2024-08-16 RX ORDER — TADALAFIL 5 MG/1
1 TABLET ORAL
COMMUNITY
Start: 2024-05-06 | End: 2024-08-16 | Stop reason: SDUPTHER

## 2024-08-19 ENCOUNTER — TELEPHONE (OUTPATIENT)
Dept: UROLOGY | Facility: HOSPITAL | Age: 44
End: 2024-08-19
Payer: COMMERCIAL

## 2024-08-19 NOTE — TELEPHONE ENCOUNTER
Left pt message about upcoming appointment information. Gave direct call back number of [phone] to call back if appt date/time doesn't work

## 2024-08-26 ENCOUNTER — OFFICE VISIT (OUTPATIENT)
Dept: UROLOGY | Facility: HOSPITAL | Age: 44
End: 2024-08-26
Payer: COMMERCIAL

## 2024-08-26 DIAGNOSIS — N40.0 BENIGN PROSTATIC HYPERPLASIA WITHOUT LOWER URINARY TRACT SYMPTOMS: Primary | ICD-10-CM

## 2024-08-26 DIAGNOSIS — Z11.3 SCREENING EXAMINATION FOR STD (SEXUALLY TRANSMITTED DISEASE): ICD-10-CM

## 2024-08-26 LAB
POC APPEARANCE, URINE: CLEAR
POC BILIRUBIN, URINE: NEGATIVE
POC BLOOD, URINE: ABNORMAL
POC COLOR, URINE: YELLOW
POC GLUCOSE, URINE: NEGATIVE MG/DL
POC KETONES, URINE: ABNORMAL MG/DL
POC LEUKOCYTES, URINE: NEGATIVE
POC NITRITE,URINE: NEGATIVE
POC PH, URINE: 6 PH
POC PROTEIN, URINE: ABNORMAL MG/DL
POC SPECIFIC GRAVITY, URINE: >=1.03
POC UROBILINOGEN, URINE: 0.2 EU/DL

## 2024-08-26 PROCEDURE — 99214 OFFICE O/P EST MOD 30 MIN: CPT | Performed by: UROLOGY

## 2024-08-26 PROCEDURE — 81003 URINALYSIS AUTO W/O SCOPE: CPT | Performed by: UROLOGY

## 2024-08-26 NOTE — PROGRESS NOTES
FUV    Last seen - 10/13/23     HISTORY OF PRESENT ILLNESS:   Lane Ireland is a 44 y.o. male who is being seen today for FUV    Last seen for vasectomy, no issues related to that    Takes daily cialis, works well    No dysuria or hematuria    PAST MEDICAL HISTORY:  Past Medical History:   Diagnosis Date    Acute pharyngitis, unspecified 10/29/2014    Sore throat    Contact with and (suspected) exposure to infections with a predominantly sexual mode of transmission 06/01/2015    STD exposure    Other specified health status     No pertinent past medical history    Personal history of other diseases of the respiratory system 10/29/2014    History of streptococcal pharyngitis       PAST SURGICAL HISTORY:  Past Surgical History:   Procedure Laterality Date    OTHER SURGICAL HISTORY  02/22/2021    No history of surgery        ALLERGIES:   Allergies   Allergen Reactions    Bee Pollen Unknown    House Dust Unknown    Penicillins Unknown        MEDICATIONS:   Current Outpatient Medications   Medication Instructions    flaxseed oiL (OMEGA-3 FLAXSEED OIL) 1,000 mg, oral, 3 times daily    meloxicam (MOBIC) 7.5 mg, oral, 2 times daily    phenazopyridine (PYRIDIUM) 100 mg, oral, 3 times daily PRN    tadalafil (CIALIS) 5 mg, oral, Daily (0630)        PHYSICAL EXAM:  There were no vitals taken for this visit.  Constitutional: Patient appears well-developed and well-nourished. No distress.    Pulmonary/Chest: Effort normal. No respiratory distress.   Musculoskeletal: Normal range of motion.    Neurological: Alert and oriented to person, place, and time.  Psychiatric: Normal mood and affect. Behavior is normal. Thought content normal.      Labs  Lab Results   Component Value Date    GFRMALE >90 08/28/2023     Lab Results   Component Value Date    CREATININE 0.95 08/28/2023     Lab Results   Component Value Date    CHOL 204 (H) 08/28/2023     Lab Results   Component Value Date    HDL 36.8 (A) 08/28/2023     Lab Results   Component  Value Date    CHHDL 5.5 (A) 08/28/2023     Lab Results   Component Value Date    LDLF 153 (H) 08/28/2023     Lab Results   Component Value Date    VLDL 15 08/28/2023     Lab Results   Component Value Date    TRIG 73 08/28/2023     Lab Results   Component Value Date    HGBA1C 5.1 04/11/2022     Lab Results   Component Value Date    HCT 47.1 08/28/2023     Assessment:      1. Benign prostatic hyperplasia without lower urinary tract symptoms  Measure post void residual    POCT UA Automated manually resulted      2. Screening examination for STD (sexually transmitted disease)          Lane Ireland is a 44 y.o. male here for FUV     Plan:   1) Refilled cialis  2) STD panel    Fu yearly, sooner if needed

## 2024-10-20 ENCOUNTER — HOSPITAL ENCOUNTER (EMERGENCY)
Facility: HOSPITAL | Age: 44
Discharge: HOME | End: 2024-10-20
Attending: EMERGENCY MEDICINE

## 2024-10-20 ENCOUNTER — APPOINTMENT (OUTPATIENT)
Dept: CARDIOLOGY | Facility: HOSPITAL | Age: 44
End: 2024-10-20

## 2024-10-20 ENCOUNTER — APPOINTMENT (OUTPATIENT)
Dept: RADIOLOGY | Facility: HOSPITAL | Age: 44
End: 2024-10-20

## 2024-10-20 VITALS
TEMPERATURE: 99.3 F | HEART RATE: 113 BPM | HEIGHT: 69 IN | DIASTOLIC BLOOD PRESSURE: 78 MMHG | WEIGHT: 200 LBS | SYSTOLIC BLOOD PRESSURE: 154 MMHG | RESPIRATION RATE: 20 BRPM | OXYGEN SATURATION: 95 % | BODY MASS INDEX: 29.62 KG/M2

## 2024-10-20 DIAGNOSIS — D18.03 HEMANGIOMA OF LIVER: ICD-10-CM

## 2024-10-20 DIAGNOSIS — R41.0 DISORIENTATION: ICD-10-CM

## 2024-10-20 DIAGNOSIS — F10.121: ICD-10-CM

## 2024-10-20 DIAGNOSIS — R55 SYNCOPE AND COLLAPSE: Primary | ICD-10-CM

## 2024-10-20 LAB
ALBUMIN SERPL BCP-MCNC: 5.1 G/DL (ref 3.4–5)
ALP SERPL-CCNC: 40 U/L (ref 33–120)
ALT SERPL W P-5'-P-CCNC: 109 U/L (ref 10–52)
AMPHETAMINES UR QL SCN: NORMAL
AMYLASE SERPL-CCNC: 28 U/L (ref 29–103)
ANION GAP BLDV CALCULATED.4IONS-SCNC: 12 MMOL/L (ref 10–25)
ANION GAP BLDV CALCULATED.4IONS-SCNC: 16 MMOL/L (ref 10–25)
ANION GAP BLDV CALCULATED.4IONS-SCNC: 23 MMOL/L (ref 10–25)
ANION GAP SERPL CALC-SCNC: 26 MMOL/L (ref 10–20)
APAP SERPL-MCNC: <10 UG/ML
APPEARANCE UR: CLEAR
APTT PPP: 29 SECONDS (ref 27–38)
AST SERPL W P-5'-P-CCNC: 89 U/L (ref 9–39)
BARBITURATES UR QL SCN: NORMAL
BASE EXCESS BLDV CALC-SCNC: -3.5 MMOL/L (ref -2–3)
BASE EXCESS BLDV CALC-SCNC: -3.9 MMOL/L (ref -2–3)
BASE EXCESS BLDV CALC-SCNC: 4.2 MMOL/L (ref -2–3)
BASOPHILS # BLD AUTO: 0.05 X10*3/UL (ref 0–0.1)
BASOPHILS NFR BLD AUTO: 0.8 %
BENZODIAZ UR QL SCN: NORMAL
BILIRUB SERPL-MCNC: 0.7 MG/DL (ref 0–1.2)
BILIRUB UR STRIP.AUTO-MCNC: NEGATIVE MG/DL
BODY TEMPERATURE: 37 DEGREES CELSIUS
BUN SERPL-MCNC: 10 MG/DL (ref 6–23)
BZE UR QL SCN: NORMAL
CA-I BLDV-SCNC: 1.08 MMOL/L (ref 1.1–1.33)
CA-I BLDV-SCNC: 1.11 MMOL/L (ref 1.1–1.33)
CA-I BLDV-SCNC: 1.15 MMOL/L (ref 1.1–1.33)
CALCIUM SERPL-MCNC: 9.2 MG/DL (ref 8.6–10.3)
CANNABINOIDS UR QL SCN: NORMAL
CARDIAC TROPONIN I PNL SERPL HS: 6 NG/L (ref 0–20)
CARDIAC TROPONIN I PNL SERPL HS: 6 NG/L (ref 0–20)
CHLORIDE BLDV-SCNC: 99 MMOL/L (ref 98–107)
CHLORIDE SERPL-SCNC: 98 MMOL/L (ref 98–107)
CO2 SERPL-SCNC: 18 MMOL/L (ref 21–32)
COLOR UR: ABNORMAL
CREAT SERPL-MCNC: 0.81 MG/DL (ref 0.5–1.3)
EGFRCR SERPLBLD CKD-EPI 2021: >90 ML/MIN/1.73M*2
EOSINOPHIL # BLD AUTO: 0.17 X10*3/UL (ref 0–0.7)
EOSINOPHIL NFR BLD AUTO: 2.7 %
ERYTHROCYTE [DISTWIDTH] IN BLOOD BY AUTOMATED COUNT: 14.3 % (ref 11.5–14.5)
ETHANOL SERPL-MCNC: 240 MG/DL
FENTANYL+NORFENTANYL UR QL SCN: NORMAL
FLUAV RNA RESP QL NAA+PROBE: NOT DETECTED
FLUBV RNA RESP QL NAA+PROBE: NOT DETECTED
GLUCOSE BLDV-MCNC: 126 MG/DL (ref 74–99)
GLUCOSE BLDV-MCNC: 127 MG/DL (ref 74–99)
GLUCOSE BLDV-MCNC: 90 MG/DL (ref 74–99)
GLUCOSE SERPL-MCNC: 84 MG/DL (ref 74–99)
GLUCOSE UR STRIP.AUTO-MCNC: NORMAL MG/DL
HCO3 BLDV-SCNC: 20.8 MMOL/L (ref 22–26)
HCO3 BLDV-SCNC: 21.5 MMOL/L (ref 22–26)
HCO3 BLDV-SCNC: 28.4 MMOL/L (ref 22–26)
HCT VFR BLD AUTO: 45.6 % (ref 41–52)
HCT VFR BLD EST: 41 % (ref 41–52)
HCT VFR BLD EST: 44 % (ref 41–52)
HCT VFR BLD EST: 47 % (ref 41–52)
HGB BLD-MCNC: 15.2 G/DL (ref 13.5–17.5)
HGB BLDV-MCNC: 13.8 G/DL (ref 13.5–17.5)
HGB BLDV-MCNC: 14.7 G/DL (ref 13.5–17.5)
HGB BLDV-MCNC: 15.6 G/DL (ref 13.5–17.5)
HOLD SPECIMEN: NORMAL
HOLD SPECIMEN: NORMAL
IMM GRANULOCYTES # BLD AUTO: 0.02 X10*3/UL (ref 0–0.7)
IMM GRANULOCYTES NFR BLD AUTO: 0.3 % (ref 0–0.9)
INHALED O2 CONCENTRATION: 21 %
INR PPP: 1 (ref 0.9–1.1)
KETONES UR STRIP.AUTO-MCNC: ABNORMAL MG/DL
LACTATE BLDV-SCNC: 3.8 MMOL/L (ref 0.4–2)
LACTATE BLDV-SCNC: 5.6 MMOL/L (ref 0.4–2)
LACTATE BLDV-SCNC: 8.1 MMOL/L (ref 0.4–2)
LACTATE BLDV-SCNC: 8.4 MMOL/L (ref 0.4–2)
LACTATE SERPL-SCNC: 5.2 MMOL/L (ref 0.4–2)
LACTATE SERPL-SCNC: 6.1 MMOL/L (ref 0.4–2)
LEUKOCYTE ESTERASE UR QL STRIP.AUTO: NEGATIVE
LYMPHOCYTES # BLD AUTO: 1.82 X10*3/UL (ref 1.2–4.8)
LYMPHOCYTES NFR BLD AUTO: 29.1 %
MAGNESIUM SERPL-MCNC: 1.7 MG/DL (ref 1.6–2.4)
MCH RBC QN AUTO: 32.3 PG (ref 26–34)
MCHC RBC AUTO-ENTMCNC: 33.3 G/DL (ref 32–36)
MCV RBC AUTO: 97 FL (ref 80–100)
METHADONE UR QL SCN: NORMAL
MONOCYTES # BLD AUTO: 0.28 X10*3/UL (ref 0.1–1)
MONOCYTES NFR BLD AUTO: 4.5 %
MUCOUS THREADS #/AREA URNS AUTO: NORMAL /LPF
NEUTROPHILS # BLD AUTO: 3.91 X10*3/UL (ref 1.2–7.7)
NEUTROPHILS NFR BLD AUTO: 62.6 %
NITRITE UR QL STRIP.AUTO: NEGATIVE
NRBC BLD-RTO: 0 /100 WBCS (ref 0–0)
OPIATES UR QL SCN: NORMAL
OXYCODONE+OXYMORPHONE UR QL SCN: NORMAL
OXYHGB MFR BLDV: 75.7 % (ref 45–75)
OXYHGB MFR BLDV: 78.1 % (ref 45–75)
OXYHGB MFR BLDV: 89.7 % (ref 45–75)
PCO2 BLDV: 36 MM HG (ref 41–51)
PCO2 BLDV: 38 MM HG (ref 41–51)
PCO2 BLDV: 40 MM HG (ref 41–51)
PCP UR QL SCN: NORMAL
PH BLDV: 7.36 PH (ref 7.33–7.43)
PH BLDV: 7.37 PH (ref 7.33–7.43)
PH BLDV: 7.46 PH (ref 7.33–7.43)
PH UR STRIP.AUTO: 5.5 [PH]
PLATELET # BLD AUTO: 282 X10*3/UL (ref 150–450)
PO2 BLDV: 45 MM HG (ref 35–45)
PO2 BLDV: 48 MM HG (ref 35–45)
PO2 BLDV: 64 MM HG (ref 35–45)
POTASSIUM BLDV-SCNC: 4.1 MMOL/L (ref 3.5–5.3)
POTASSIUM BLDV-SCNC: 4.2 MMOL/L (ref 3.5–5.3)
POTASSIUM BLDV-SCNC: 4.3 MMOL/L (ref 3.5–5.3)
POTASSIUM SERPL-SCNC: 4.3 MMOL/L (ref 3.5–5.3)
PROT SERPL-MCNC: 7.8 G/DL (ref 6.4–8.2)
PROT UR STRIP.AUTO-MCNC: ABNORMAL MG/DL
PROTHROMBIN TIME: 11.4 SECONDS (ref 9.8–12.8)
RBC # BLD AUTO: 4.71 X10*6/UL (ref 4.5–5.9)
RBC # UR STRIP.AUTO: ABNORMAL /UL
RBC #/AREA URNS AUTO: NORMAL /HPF
RSV RNA RESP QL NAA+PROBE: NOT DETECTED
SALICYLATES SERPL-MCNC: <3 MG/DL
SAO2 % BLDV: 78 % (ref 45–75)
SAO2 % BLDV: 80 % (ref 45–75)
SAO2 % BLDV: 92 % (ref 45–75)
SARS-COV-2 RNA RESP QL NAA+PROBE: NOT DETECTED
SODIUM BLDV-SCNC: 132 MMOL/L (ref 136–145)
SODIUM BLDV-SCNC: 135 MMOL/L (ref 136–145)
SODIUM BLDV-SCNC: 139 MMOL/L (ref 136–145)
SODIUM SERPL-SCNC: 138 MMOL/L (ref 136–145)
SP GR UR STRIP.AUTO: 1.03
UROBILINOGEN UR STRIP.AUTO-MCNC: NORMAL MG/DL
WBC # BLD AUTO: 6.3 X10*3/UL (ref 4.4–11.3)
WBC #/AREA URNS AUTO: NORMAL /HPF

## 2024-10-20 PROCEDURE — 36415 COLL VENOUS BLD VENIPUNCTURE: CPT

## 2024-10-20 PROCEDURE — 99285 EMERGENCY DEPT VISIT HI MDM: CPT | Mod: 25

## 2024-10-20 PROCEDURE — 80307 DRUG TEST PRSMV CHEM ANLYZR: CPT

## 2024-10-20 PROCEDURE — 2500000005 HC RX 250 GENERAL PHARMACY W/O HCPCS

## 2024-10-20 PROCEDURE — 83605 ASSAY OF LACTIC ACID: CPT | Performed by: EMERGENCY MEDICINE

## 2024-10-20 PROCEDURE — 36415 COLL VENOUS BLD VENIPUNCTURE: CPT | Performed by: EMERGENCY MEDICINE

## 2024-10-20 PROCEDURE — 71275 CT ANGIOGRAPHY CHEST: CPT

## 2024-10-20 PROCEDURE — 84132 ASSAY OF SERUM POTASSIUM: CPT

## 2024-10-20 PROCEDURE — 70450 CT HEAD/BRAIN W/O DYE: CPT | Performed by: RADIOLOGY

## 2024-10-20 PROCEDURE — 85610 PROTHROMBIN TIME: CPT

## 2024-10-20 PROCEDURE — 84132 ASSAY OF SERUM POTASSIUM: CPT | Mod: 59 | Performed by: EMERGENCY MEDICINE

## 2024-10-20 PROCEDURE — 93005 ELECTROCARDIOGRAM TRACING: CPT

## 2024-10-20 PROCEDURE — 80143 DRUG ASSAY ACETAMINOPHEN: CPT

## 2024-10-20 PROCEDURE — 70450 CT HEAD/BRAIN W/O DYE: CPT

## 2024-10-20 PROCEDURE — 87637 SARSCOV2&INF A&B&RSV AMP PRB: CPT | Performed by: EMERGENCY MEDICINE

## 2024-10-20 PROCEDURE — 96374 THER/PROPH/DIAG INJ IV PUSH: CPT | Mod: 59

## 2024-10-20 PROCEDURE — 84484 ASSAY OF TROPONIN QUANT: CPT | Performed by: EMERGENCY MEDICINE

## 2024-10-20 PROCEDURE — 81003 URINALYSIS AUTO W/O SCOPE: CPT | Performed by: EMERGENCY MEDICINE

## 2024-10-20 PROCEDURE — 82435 ASSAY OF BLOOD CHLORIDE: CPT | Performed by: EMERGENCY MEDICINE

## 2024-10-20 PROCEDURE — 80320 DRUG SCREEN QUANTALCOHOLS: CPT

## 2024-10-20 PROCEDURE — 2500000004 HC RX 250 GENERAL PHARMACY W/ HCPCS (ALT 636 FOR OP/ED)

## 2024-10-20 PROCEDURE — 82150 ASSAY OF AMYLASE: CPT | Performed by: EMERGENCY MEDICINE

## 2024-10-20 PROCEDURE — 80053 COMPREHEN METABOLIC PANEL: CPT | Performed by: EMERGENCY MEDICINE

## 2024-10-20 PROCEDURE — 2550000001 HC RX 255 CONTRASTS: Performed by: EMERGENCY MEDICINE

## 2024-10-20 PROCEDURE — 82435 ASSAY OF BLOOD CHLORIDE: CPT

## 2024-10-20 PROCEDURE — 83735 ASSAY OF MAGNESIUM: CPT | Performed by: EMERGENCY MEDICINE

## 2024-10-20 PROCEDURE — 85025 COMPLETE CBC W/AUTO DIFF WBC: CPT | Performed by: EMERGENCY MEDICINE

## 2024-10-20 RX ORDER — THIAMINE HYDROCHLORIDE 100 MG/ML
100 INJECTION, SOLUTION INTRAMUSCULAR; INTRAVENOUS ONCE
Status: COMPLETED | OUTPATIENT
Start: 2024-10-20 | End: 2024-10-20

## 2024-10-20 RX ORDER — WATER
200 LIQUID (ML) MISCELLANEOUS
Status: DISCONTINUED | OUTPATIENT
Start: 2024-10-20 | End: 2024-10-20 | Stop reason: HOSPADM

## 2024-10-20 RX ADMIN — ALUMINUM HYDROXIDE, MAGNESIUM HYDROXIDE, AND SIMETHICONE 10 ML: 1200; 120; 1200 SUSPENSION ORAL at 13:02

## 2024-10-20 RX ADMIN — Medication 200 ML: at 15:52

## 2024-10-20 RX ADMIN — SODIUM CHLORIDE, POTASSIUM CHLORIDE, SODIUM LACTATE AND CALCIUM CHLORIDE 1000 ML: 600; 310; 30; 20 INJECTION, SOLUTION INTRAVENOUS at 11:05

## 2024-10-20 RX ADMIN — Medication 200 ML: at 15:53

## 2024-10-20 RX ADMIN — THIAMINE HYDROCHLORIDE 100 MG: 100 INJECTION, SOLUTION INTRAMUSCULAR; INTRAVENOUS at 13:02

## 2024-10-20 RX ADMIN — IOHEXOL 90 ML: 350 INJECTION, SOLUTION INTRAVENOUS at 11:02

## 2024-10-20 RX ADMIN — Medication 200 ML: at 13:10

## 2024-10-20 ASSESSMENT — PAIN SCALES - GENERAL
PAINLEVEL_OUTOF10: 4
PAINLEVEL_OUTOF10: 5 - MODERATE PAIN
PAINLEVEL_OUTOF10: 4
PAINLEVEL_OUTOF10: 10 - WORST POSSIBLE PAIN

## 2024-10-20 ASSESSMENT — PAIN DESCRIPTION - PAIN TYPE: TYPE: ACUTE PAIN

## 2024-10-20 ASSESSMENT — PAIN DESCRIPTION - LOCATION: LOCATION: HEAD

## 2024-10-20 ASSESSMENT — PAIN - FUNCTIONAL ASSESSMENT
PAIN_FUNCTIONAL_ASSESSMENT: FLACC (FACE, LEGS, ACTIVITY, CRY, CONSOLABILITY)
PAIN_FUNCTIONAL_ASSESSMENT: 0-10

## 2024-10-20 ASSESSMENT — PAIN DESCRIPTION - PROGRESSION: CLINICAL_PROGRESSION: GRADUALLY IMPROVING

## 2024-10-20 NOTE — ED NOTES
"Late entry: While at bedside pt reported to staff that he was having blurred vision and \"fuzziness\" with HA/neck pain; when completing neuro assessment pt was able to participate, but when asked to smile at bedside said \"the left side of his face looks funny\"   ER provider was requested to come to bedside; resident came to bedside; CT scan of head without con was ordered; CT was called by this RN regarding imaging, their response was to wait until labs were complete for the totality of scans ordered        Jessy Shen RN  10/20/24 [id number]    "

## 2024-10-20 NOTE — DISCHARGE INSTRUCTIONS
You were seen today for confusion and episodes of passing out, but we call syncope.  All of your imaging and labs appear relatively normal, however, your alcohol was quite elevated upon presentation.  This is all likely in the setting of alcohol use.  It may be worthwhile to consider decreasing the amount that you drink, or considering stopping altogether.

## 2024-10-20 NOTE — ED TRIAGE NOTES
Patient presented to ED via private car, brought by family members. Patient states he has CP.  Per wife patient is altered and had a syncopal episode.

## 2024-10-20 NOTE — ED NOTES
CT called to see if this RN could bring pt over for CT head w/o con; CT stated they wanted to wait for labs to come back because there were other scans ordered that required contrast dye     Jessy Shen RN  10/20/24 [id number]

## 2024-10-20 NOTE — ED PROVIDER NOTES
History of Present Illness     History provided by: Patient and Significant Other  Limitations to History: Confusion  External Records Reviewed with Brief Summary:  Previous ED visits, problem list that demonstrates history of alcohol use disorder, GIRON    HPI:  Lane Ireland is a 44 y.o. male past medical history of hypertension alcohol use disorder GIRON who presents today for altered mental status and syncope.  Per patient's wife, patient has been acting normally until earlier this morning when he was complaining of chest pain.  She then noted that he fell in the driveway.  When he fell in the driveway, she had difficulty getting him up, but got him into the car.  He was then responsive while in the car, however, became unresponsive. Denies loss of bowel bladder, denies any drugs, occasional ETOH. Denies history of seizures. Endorses chest pain, back pain, neck pain.    Physical Exam   Triage vitals:  T    HR (!) 111  BP (!) 178/110  RR    O2 94 % None (Room air)    Physical Exam  Vitals and nursing note reviewed.   Constitutional:       General: He is not in acute distress.     Appearance: Normal appearance. He is not ill-appearing or diaphoretic.      Comments: Smells of alcohol   HENT:      Head: Normocephalic and atraumatic.      Nose: Nose normal.      Mouth/Throat:      Mouth: Mucous membranes are moist.      Pharynx: Oropharynx is clear. No oropharyngeal exudate or posterior oropharyngeal erythema.   Eyes:      General: No scleral icterus.     Extraocular Movements: Extraocular movements intact.      Pupils: Pupils are equal, round, and reactive to light.   Cardiovascular:      Rate and Rhythm: Regular rhythm. Tachycardia present.      Pulses: Normal pulses.      Heart sounds: Normal heart sounds. No murmur heard.     No gallop.      Comments: 2+ carotid radial femoral DP and PT pulses bilaterally, equal in all 4 extremities  Pulmonary:      Effort: Pulmonary effort is normal. No respiratory distress.       Breath sounds: Normal breath sounds. No stridor. No wheezing, rhonchi or rales.   Abdominal:      General: Bowel sounds are normal. There is no distension.      Palpations: Abdomen is soft. There is no mass.      Tenderness: There is no abdominal tenderness. There is no guarding or rebound.      Hernia: No hernia is present.   Musculoskeletal:         General: No swelling, deformity or signs of injury. Normal range of motion.      Cervical back: Normal range of motion and neck supple. No rigidity or tenderness.      Right lower leg: No edema.      Left lower leg: No edema.   Skin:     General: Skin is warm and dry.      Capillary Refill: Capillary refill takes less than 2 seconds.      Coloration: Skin is not jaundiced.      Findings: No erythema, lesion or rash.   Neurological:      General: No focal deficit present.      Mental Status: He is alert. Mental status is at baseline.      Comments: Confused, oriented to self but not place, date. Otherwise sensation intact, strength intact, nonfocal exam   Psychiatric:         Mood and Affect: Mood normal.         Behavior: Behavior normal.          Medical Decision Making & ED Course   Medical Decision Makin y.o. male past medical history of alcohol use disorder, GIRON, hypertension who presents today for altered mental status and syncope.  Given patient's reported episodes of syncope as well as chest pain, as well as fact the patient is tachycardic and hypertensive my concern would be that the patient has a dissection.  Patient's history would also be concerning for possible alcohol intoxication given the patient's somewhat ambivalent given his presentation.  Given this, we will get a tox screen, we will also get CT chest abdomen pelvis to assess for dissection.  Will also get troponins BNP and basic labs to evaluate for other potential causes of syncope.  Patient's labs did not demonstrate any significant abnormality other than an elevated lactate, he did  have an alcohol of 240.  Patient imaging did not demonstrate any evidence of dissection or other acute abnormality.  Patient's lactate continued to downtrend after encouraging oral hydration as well as 1 L of LR.  Did initially plateau somewhat, so I did provide him with thiamine given the fact the patient has been drinking alcohol rather significantly.  Once the patient's lactate demonstrated continued downtrending, we decided it was appropriate to discharge her.  I did discuss with him that he should probably consider decreasing the amount that he drinks.  Patient was clinically sober at the time of discharge, able to tolerate p.o. intake and ambulate to and from the bathroom independently.  All questions were answered prior to discharge, return precaution provided.  ----      Differential diagnoses considered include but are not limited to: Dissection, ACS, vasovagal syncope, alcohol intoxication     Social Determinants of Health which Significantly Impact Care: None identified     EKG Independent Interpretation: EKG interpreted by myself. Please see ED Course for full interpretation.    Independent Result Review and Interpretation: Relevant laboratory and radiographic results were reviewed and independently interpreted by myself.  As necessary, they are commented on in the ED Course.    Chronic conditions affecting the patient's care: As documented above in [facility]    The patient was discussed with the following consultants/services: None    Care Considerations: As documented above in [facility]    ED Course:  ED Course as of 10/20/24 1900   Sun Oct 20, 2024   0950 EKG demonstrates sinus tachycardia with a rate of 111 normal [place] QRS and QTc intervals, no clear ST elevation, T wave inversion or ST depression suggestive of MI.  Similar to previous EKGs. [IB]   1000 Called to room to reevaluate patient for concerns for facial droop, patient does not appear to have any facial droop, cranial nerves are intact, no asymmetry in  strength of bilateral upper or lower extremities.  Nonfocal exam, will continue to monitor [IB]      ED Course User Index  [IB] Naresh Boston MD         Diagnoses as of 10/20/24 1900   Syncope and collapse   Disorientation   Alcohol intoxication delirium (CMS-HCC)   Hemangioma of liver     Disposition   As a result of the work-up, the patient was discharged home.  he was informed of his diagnosis and instructed to come back with any concerns or worsening of condition.  he and was agreeable to the plan as discussed above.  he was given the opportunity to ask questions.  All of the patient's questions were answered.    Procedures   Procedures    Patient seen and discussed with ED attending physician.    Naresh Boston MD  Emergency Medicine       Naresh Boston MD  Resident  10/20/24 1903

## 2024-10-20 NOTE — PROGRESS NOTES
Patient has been determined to require contrast on an emergent basis either despite GFR <30, or with unknown GFR. Patient will be hydrated prior to the scan to attempt to limit risk for kidney injury secondary to contrast.

## 2024-10-26 LAB
ATRIAL RATE: 111 BPM
ATRIAL RATE: 98 BPM
P AXIS: 50 DEGREES
P AXIS: 64 DEGREES
P OFFSET: 207 MS
P OFFSET: 208 MS
P ONSET: 156 MS
P ONSET: 156 MS
PR INTERVAL: 122 MS
PR INTERVAL: 122 MS
Q ONSET: 217 MS
Q ONSET: 217 MS
QRS COUNT: 16 BEATS
QRS COUNT: 18 BEATS
QRS DURATION: 82 MS
QRS DURATION: 88 MS
QT INTERVAL: 342 MS
QT INTERVAL: 398 MS
QTC CALCULATION(BAZETT): 465 MS
QTC CALCULATION(BAZETT): 508 MS
QTC FREDERICIA: 420 MS
QTC FREDERICIA: 468 MS
R AXIS: 18 DEGREES
R AXIS: 18 DEGREES
T AXIS: 37 DEGREES
T AXIS: 51 DEGREES
T OFFSET: 388 MS
T OFFSET: 416 MS
VENTRICULAR RATE: 111 BPM
VENTRICULAR RATE: 98 BPM

## 2025-08-19 DIAGNOSIS — N52.9 MALE ERECTILE DISORDER: Primary | ICD-10-CM

## 2025-08-19 RX ORDER — TADALAFIL 5 MG/1
TABLET ORAL
Qty: 90 TABLET | Refills: 2 | Status: SHIPPED | OUTPATIENT
Start: 2025-08-19

## 2025-08-26 ENCOUNTER — APPOINTMENT (OUTPATIENT)
Dept: UROLOGY | Facility: HOSPITAL | Age: 45
End: 2025-08-26
Payer: COMMERCIAL